# Patient Record
Sex: FEMALE | NOT HISPANIC OR LATINO | Employment: UNEMPLOYED | URBAN - METROPOLITAN AREA
[De-identification: names, ages, dates, MRNs, and addresses within clinical notes are randomized per-mention and may not be internally consistent; named-entity substitution may affect disease eponyms.]

---

## 2019-04-09 LAB
EXTERNAL ABO GROUPING: NORMAL
EXTERNAL ANTIBODY SCREEN: NEGATIVE
EXTERNAL HEPATITIS B SURFACE ANTIGEN: NEGATIVE
EXTERNAL HEPATITIS C AB: NEGATIVE
EXTERNAL RH FACTOR: POSITIVE
EXTERNAL RUBELLA QUALITATIVE: NORMAL
EXTERNAL SYPHILIS RPR SCREEN: NEGATIVE
HIV1 P24 AG SERPL QL IA: NEGATIVE

## 2019-08-16 ENCOUNTER — OFFICE VISIT (OUTPATIENT)
Dept: ENDOCRINOLOGY | Facility: CLINIC | Age: 24
End: 2019-08-16

## 2019-08-16 DIAGNOSIS — O24.410 DIET CONTROLLED GESTATIONAL DIABETES MELLITUS (GDM) IN THIRD TRIMESTER: ICD-10-CM

## 2019-08-16 PROCEDURE — G0108 DIAB MANAGE TRN  PER INDIV: HCPCS | Performed by: DIETITIAN, REGISTERED

## 2019-08-16 RX ORDER — BLOOD SUGAR DIAGNOSTIC
STRIP MISCELLANEOUS
Qty: 120 EACH | Refills: 3 | Status: SHIPPED | OUTPATIENT
Start: 2019-08-16 | End: 2019-10-12 | Stop reason: HOSPADM

## 2019-08-16 RX ORDER — BLOOD SUGAR DIAGNOSTIC
1 STRIP MISCELLANEOUS AS NEEDED
COMMUNITY
End: 2019-08-16 | Stop reason: SDUPTHER

## 2019-08-21 ENCOUNTER — LAB (OUTPATIENT)
Dept: LAB | Facility: HOSPITAL | Age: 24
End: 2019-08-21

## 2019-08-21 ENCOUNTER — OFFICE VISIT (OUTPATIENT)
Dept: ENDOCRINOLOGY | Facility: CLINIC | Age: 24
End: 2019-08-21

## 2019-08-21 VITALS
WEIGHT: 163 LBS | BODY MASS INDEX: 25.58 KG/M2 | DIASTOLIC BLOOD PRESSURE: 65 MMHG | HEIGHT: 67 IN | HEART RATE: 84 BPM | OXYGEN SATURATION: 99 % | SYSTOLIC BLOOD PRESSURE: 110 MMHG

## 2019-08-21 DIAGNOSIS — O24.419 GESTATIONAL DIABETES MELLITUS (GDM) IN THIRD TRIMESTER, GESTATIONAL DIABETES METHOD OF CONTROL UNSPECIFIED: ICD-10-CM

## 2019-08-21 DIAGNOSIS — O24.419 GESTATIONAL DIABETES MELLITUS (GDM) IN THIRD TRIMESTER, GESTATIONAL DIABETES METHOD OF CONTROL UNSPECIFIED: Primary | ICD-10-CM

## 2019-08-21 LAB — GLUCOSE BLDC GLUCOMTR-MCNC: 79 MG/DL (ref 70–130)

## 2019-08-21 PROCEDURE — 99202 OFFICE O/P NEW SF 15 MIN: CPT | Performed by: INTERNAL MEDICINE

## 2019-08-21 PROCEDURE — 36415 COLL VENOUS BLD VENIPUNCTURE: CPT

## 2019-08-21 PROCEDURE — 83036 HEMOGLOBIN GLYCOSYLATED A1C: CPT | Performed by: INTERNAL MEDICINE

## 2019-08-21 PROCEDURE — 82962 GLUCOSE BLOOD TEST: CPT | Performed by: INTERNAL MEDICINE

## 2019-08-21 RX ORDER — CHLORPHENIR/PHENYLEPH/ASPIRIN 2-7.8-325
TABLET, EFFERVESCENT ORAL
Refills: 3 | COMMUNITY
Start: 2019-08-16 | End: 2019-10-12 | Stop reason: HOSPADM

## 2019-08-21 NOTE — PROGRESS NOTES
Subjective   Jaylyn Crockett is a 24 y.o. female.     History of Present Illness     This is 24- year old female who is referred for Gestational Diabetes . She is G2 ( 31 week Pregnant ) . She failed 3 - Hr OGTT . She already had Diabetes education / Nutrition class .   She is checking BG 5 x daily - Fasting BS < 90 , and 1 hr glucose is < 120 mg /dl.     The following portions of the patient's history were reviewed and updated as appropriate: allergies, current medications, past family history, past medical history, past social history, past surgical history and problem list.    Review of Systems   Constitutional: Negative.    Eyes: Negative.    Respiratory: Negative.    Cardiovascular: Negative.    Gastrointestinal: Negative.    Endocrine: Negative.    Genitourinary: Negative for dysuria, frequency and urgency.   Musculoskeletal: negative for joint swelling,  Negative for myalgias.   Neurological: Negative for dizziness, light-headedness and memory problem.   Psychiatric/Behavioral: Negative for behavioral problems, hallucinations and depressed mood.       Physical Exam   Constitutional:  oriented to person, place, and time. She appears well-developed and well-nourished.   HENT:   Head: Normocephalic and atraumatic.   Eyes: EOM are normal. Pupils are equal, round, and reactive to light.   Neck: Normal range of motion. Neck supple.   Cardiovascular: Normal rate, regular rhythm and normal heart sounds.   Pulmonary/Chest: Effort normal and breath sounds normal.   Abdominal: Soft. Bowel sounds are normal.  +Ditension ( Gravid )   Musculoskeletal: Normal range of motion.   Neurological: She is alert and oriented to person, place, and time.   Skin: Skin is warm.   Psychiatric: normal mood and affect.  behavior is normal. Judgment and thought content normal.     Assessment/Plan     Gestational diabetes mellitus  BS are at good . Check A1c . Advised to continue monitor BS - call office for if FBS > 90 ,and 2 hr glucose  >120 mg/dl.

## 2019-08-21 NOTE — ASSESSMENT & PLAN NOTE
BS are at good . Check A1c . Advised to continue monitor BS - call office for if FBS > 90 ,and 2 hr glucose >120 mg/dl.

## 2019-08-22 LAB — HBA1C MFR BLD: 4.7 % (ref 3.5–5.6)

## 2019-08-28 ENCOUNTER — TELEPHONE (OUTPATIENT)
Dept: ENDOCRINOLOGY | Facility: CLINIC | Age: 24
End: 2019-08-28

## 2019-08-28 NOTE — TELEPHONE ENCOUNTER
Patient not available. Sent email No changes. BG well controlled. To keep sending BG results  Scanned to media

## 2019-09-03 ENCOUNTER — TELEPHONE (OUTPATIENT)
Dept: ENDOCRINOLOGY | Facility: CLINIC | Age: 24
End: 2019-09-03

## 2019-09-04 ENCOUNTER — TELEPHONE (OUTPATIENT)
Dept: ENDOCRINOLOGY | Facility: CLINIC | Age: 24
End: 2019-09-04

## 2019-09-12 ENCOUNTER — TELEPHONE (OUTPATIENT)
Dept: ENDOCRINOLOGY | Facility: CLINIC | Age: 24
End: 2019-09-12

## 2019-09-18 ENCOUNTER — OFFICE VISIT (OUTPATIENT)
Dept: ENDOCRINOLOGY | Facility: CLINIC | Age: 24
End: 2019-09-18

## 2019-09-18 VITALS
OXYGEN SATURATION: 96 % | HEIGHT: 67 IN | DIASTOLIC BLOOD PRESSURE: 80 MMHG | SYSTOLIC BLOOD PRESSURE: 122 MMHG | HEART RATE: 99 BPM | WEIGHT: 174 LBS | BODY MASS INDEX: 27.31 KG/M2

## 2019-09-18 DIAGNOSIS — O24.419 GESTATIONAL DIABETES MELLITUS (GDM) IN THIRD TRIMESTER, GESTATIONAL DIABETES METHOD OF CONTROL UNSPECIFIED: Primary | ICD-10-CM

## 2019-09-18 LAB — GLUCOSE BLDC GLUCOMTR-MCNC: 88 MG/DL (ref 70–130)

## 2019-09-18 PROCEDURE — 99212 OFFICE O/P EST SF 10 MIN: CPT | Performed by: INTERNAL MEDICINE

## 2019-09-18 PROCEDURE — 82962 GLUCOSE BLOOD TEST: CPT | Performed by: INTERNAL MEDICINE

## 2019-09-18 NOTE — PROGRESS NOTES
Subjective   Jaylyn Crockett is a 24 y.o. female.     History of Present Illness     This is 24- year old female who is referred for Gestational Diabetes . She is G2 ( 35 week Pregnant ) . She failed 3 - Hr OGTT . She  had Diabetes education / Nutrition class .   She is checking BG 5 x daily - Forgot to bring her log book . Reports most of her BS are at goal  mg /dl.     The following portions of the patient's history were reviewed and updated as appropriate: allergies, current medications, past family history, past medical history, past surgical history and problem list.    Review of Systems - Negative .     Objective   Physical Exam   Constitutional: She appears well-developed and well-nourished.   Eyes: Pupils are equal, round, and reactive to light.   Neck: Neck supple.   Cardiovascular: Normal rate.   Abdominal: Soft. She exhibits distension.   Skin: Skin is warm.           Assessment/Plan     Gestational diabetes mellitus  Diabetes is well controlled . A1c is 4.7 % - Continue to watch diet .

## 2019-09-20 LAB — EXTERNAL GROUP B STREP ANTIGEN: NEGATIVE

## 2019-09-26 ENCOUNTER — TELEPHONE (OUTPATIENT)
Dept: ENDOCRINOLOGY | Facility: CLINIC | Age: 24
End: 2019-09-26

## 2019-10-10 ENCOUNTER — ANESTHESIA (OUTPATIENT)
Dept: LABOR AND DELIVERY | Facility: HOSPITAL | Age: 24
End: 2019-10-10

## 2019-10-10 ENCOUNTER — ANESTHESIA EVENT (OUTPATIENT)
Dept: LABOR AND DELIVERY | Facility: HOSPITAL | Age: 24
End: 2019-10-10

## 2019-10-10 ENCOUNTER — HOSPITAL ENCOUNTER (OUTPATIENT)
Dept: LABOR AND DELIVERY | Facility: HOSPITAL | Age: 24
Discharge: HOME | End: 2019-10-10

## 2019-10-10 ENCOUNTER — HOSPITAL ENCOUNTER (INPATIENT)
Facility: HOSPITAL | Age: 24
LOS: 2 days | Discharge: HOME | End: 2019-10-12
Attending: OBSTETRICS & GYNECOLOGY | Admitting: OBSTETRICS & GYNECOLOGY

## 2019-10-10 PROBLEM — Z34.90 PREGNANCY: Status: RESOLVED | Noted: 2019-10-10 | Resolved: 2019-10-10

## 2019-10-10 PROBLEM — O24.419 GESTATIONAL DIABETES MELLITUS: Status: RESOLVED | Noted: 2019-08-21 | Resolved: 2019-10-10

## 2019-10-10 PROBLEM — Z34.90 PREGNANCY: Status: ACTIVE | Noted: 2019-10-10

## 2019-10-10 LAB
ABO GROUP BLD: NORMAL
BASOPHILS # BLD AUTO: 0.1 10*3/MM3 (ref 0–0.2)
BASOPHILS NFR BLD AUTO: 0.7 % (ref 0–1.5)
BB HOLD TUBE: NORMAL
BLD GP AB SCN SERPL QL: NEGATIVE
DEPRECATED RDW RBC AUTO: 48.6 FL (ref 37–54)
EOSINOPHIL # BLD AUTO: 0.1 10*3/MM3 (ref 0–0.4)
EOSINOPHIL NFR BLD AUTO: 0.7 % (ref 0.3–6.2)
ERYTHROCYTE [DISTWIDTH] IN BLOOD BY AUTOMATED COUNT: 15.3 % (ref 12.3–15.4)
GLUCOSE BLDC GLUCOMTR-MCNC: 76 MG/DL (ref 70–105)
GLUCOSE BLDC GLUCOMTR-MCNC: 85 MG/DL (ref 70–105)
GLUCOSE BLDC GLUCOMTR-MCNC: 95 MG/DL (ref 70–105)
HCT VFR BLD AUTO: 40.4 % (ref 34–46.6)
HGB BLD-MCNC: 13.6 G/DL (ref 12–15.9)
HOLD SPECIMEN: NORMAL
LYMPHOCYTES # BLD AUTO: 1.7 10*3/MM3 (ref 0.7–3.1)
LYMPHOCYTES NFR BLD AUTO: 16 % (ref 19.6–45.3)
MCH RBC QN AUTO: 30.6 PG (ref 26.6–33)
MCHC RBC AUTO-ENTMCNC: 33.7 G/DL (ref 31.5–35.7)
MCV RBC AUTO: 90.7 FL (ref 79–97)
MONOCYTES # BLD AUTO: 0.7 10*3/MM3 (ref 0.1–0.9)
MONOCYTES NFR BLD AUTO: 6.9 % (ref 5–12)
NEUTROPHILS # BLD AUTO: 8 10*3/MM3 (ref 1.7–7)
NEUTROPHILS NFR BLD AUTO: 75.7 % (ref 42.7–76)
NRBC BLD AUTO-RTO: 0.1 /100 WBC (ref 0–0.2)
PLATELET # BLD AUTO: 182 10*3/MM3 (ref 140–450)
PMV BLD AUTO: 9.3 FL (ref 6–12)
RBC # BLD AUTO: 4.45 10*6/MM3 (ref 3.77–5.28)
RH BLD: POSITIVE
T&S EXPIRATION DATE: NORMAL
WBC NRBC COR # BLD: 10.6 10*3/MM3 (ref 3.4–10.8)
WHOLE BLOOD HOLD SPECIMEN: NORMAL

## 2019-10-10 PROCEDURE — 86901 BLOOD TYPING SEROLOGIC RH(D): CPT

## 2019-10-10 PROCEDURE — 86850 RBC ANTIBODY SCREEN: CPT | Performed by: OBSTETRICS & GYNECOLOGY

## 2019-10-10 PROCEDURE — 86901 BLOOD TYPING SEROLOGIC RH(D): CPT | Performed by: OBSTETRICS & GYNECOLOGY

## 2019-10-10 PROCEDURE — 86780 TREPONEMA PALLIDUM: CPT | Performed by: OBSTETRICS & GYNECOLOGY

## 2019-10-10 PROCEDURE — 25010000002 FENTANYL CITRATE (PF) 100 MCG/2ML SOLUTION 2 ML VIAL: Performed by: ANESTHESIOLOGY

## 2019-10-10 PROCEDURE — 25010000002 ROPIVACAINE PER 1 MG: Performed by: ANESTHESIOLOGY

## 2019-10-10 PROCEDURE — 86900 BLOOD TYPING SEROLOGIC ABO: CPT

## 2019-10-10 PROCEDURE — 82962 GLUCOSE BLOOD TEST: CPT

## 2019-10-10 PROCEDURE — 86900 BLOOD TYPING SEROLOGIC ABO: CPT | Performed by: OBSTETRICS & GYNECOLOGY

## 2019-10-10 PROCEDURE — 0UQKXZZ REPAIR HYMEN, EXTERNAL APPROACH: ICD-10-PCS | Performed by: OBSTETRICS & GYNECOLOGY

## 2019-10-10 PROCEDURE — 85025 COMPLETE CBC W/AUTO DIFF WBC: CPT | Performed by: OBSTETRICS & GYNECOLOGY

## 2019-10-10 PROCEDURE — C1755 CATHETER, INTRASPINAL: HCPCS | Performed by: ANESTHESIOLOGY

## 2019-10-10 RX ORDER — ONDANSETRON 2 MG/ML
4 INJECTION INTRAMUSCULAR; INTRAVENOUS EVERY 6 HOURS PRN
Status: DISCONTINUED | OUTPATIENT
Start: 2019-10-10 | End: 2019-10-10 | Stop reason: HOSPADM

## 2019-10-10 RX ORDER — DIPHENHYDRAMINE HYDROCHLORIDE 50 MG/ML
12.5 INJECTION INTRAMUSCULAR; INTRAVENOUS EVERY 8 HOURS PRN
Status: DISCONTINUED | OUTPATIENT
Start: 2019-10-10 | End: 2019-10-10 | Stop reason: HOSPADM

## 2019-10-10 RX ORDER — SODIUM CHLORIDE, SODIUM LACTATE, POTASSIUM CHLORIDE, CALCIUM CHLORIDE 600; 310; 30; 20 MG/100ML; MG/100ML; MG/100ML; MG/100ML
125 INJECTION, SOLUTION INTRAVENOUS CONTINUOUS
Status: DISCONTINUED | OUTPATIENT
Start: 2019-10-10 | End: 2019-10-10

## 2019-10-10 RX ORDER — ONDANSETRON 2 MG/ML
4 INJECTION INTRAMUSCULAR; INTRAVENOUS ONCE AS NEEDED
Status: DISCONTINUED | OUTPATIENT
Start: 2019-10-10 | End: 2019-10-10 | Stop reason: HOSPADM

## 2019-10-10 RX ORDER — LIDOCAINE HYDROCHLORIDE 10 MG/ML
INJECTION, SOLUTION EPIDURAL; INFILTRATION; INTRACAUDAL; PERINEURAL
Status: COMPLETED
Start: 2019-10-10 | End: 2019-10-10

## 2019-10-10 RX ORDER — CARBOPROST TROMETHAMINE 250 UG/ML
250 INJECTION, SOLUTION INTRAMUSCULAR AS NEEDED
Status: DISCONTINUED | OUTPATIENT
Start: 2019-10-10 | End: 2019-10-10 | Stop reason: HOSPADM

## 2019-10-10 RX ORDER — ACETAMINOPHEN 325 MG/1
650 TABLET ORAL EVERY 4 HOURS PRN
Status: DISCONTINUED | OUTPATIENT
Start: 2019-10-10 | End: 2019-10-10 | Stop reason: HOSPADM

## 2019-10-10 RX ORDER — ONDANSETRON 4 MG/1
4 TABLET, FILM COATED ORAL EVERY 6 HOURS PRN
Status: DISCONTINUED | OUTPATIENT
Start: 2019-10-10 | End: 2019-10-10 | Stop reason: HOSPADM

## 2019-10-10 RX ORDER — OXYTOCIN-SODIUM CHLORIDE 0.9% IV SOLN 30 UNIT/500ML 30-0.9/5 UT/ML-%
250 SOLUTION INTRAVENOUS CONTINUOUS
Status: ACTIVE | OUTPATIENT
Start: 2019-10-10 | End: 2019-10-10

## 2019-10-10 RX ORDER — EPHEDRINE SULFATE 50 MG/ML
10 INJECTION, SOLUTION INTRAVENOUS
Status: DISCONTINUED | OUTPATIENT
Start: 2019-10-10 | End: 2019-10-10 | Stop reason: HOSPADM

## 2019-10-10 RX ORDER — MISOPROSTOL 200 UG/1
800 TABLET ORAL AS NEEDED
Status: DISCONTINUED | OUTPATIENT
Start: 2019-10-10 | End: 2019-10-10 | Stop reason: HOSPADM

## 2019-10-10 RX ORDER — OXYTOCIN-SODIUM CHLORIDE 0.9% IV SOLN 30 UNIT/500ML 30-0.9/5 UT/ML-%
999 SOLUTION INTRAVENOUS ONCE
Status: DISCONTINUED | OUTPATIENT
Start: 2019-10-10 | End: 2019-10-12 | Stop reason: HOSPADM

## 2019-10-10 RX ORDER — FAMOTIDINE 20 MG/1
20 TABLET, FILM COATED ORAL
Status: DISCONTINUED | OUTPATIENT
Start: 2019-10-10 | End: 2019-10-10 | Stop reason: HOSPADM

## 2019-10-10 RX ORDER — LANOLIN 100 %
OINTMENT (GRAM) TOPICAL
Status: DISCONTINUED | OUTPATIENT
Start: 2019-10-10 | End: 2019-10-12 | Stop reason: HOSPADM

## 2019-10-10 RX ORDER — MAGNESIUM CARB/ALUMINUM HYDROX 105-160MG
30 TABLET,CHEWABLE ORAL ONCE AS NEEDED
Status: DISCONTINUED | OUTPATIENT
Start: 2019-10-10 | End: 2019-10-10 | Stop reason: HOSPADM

## 2019-10-10 RX ORDER — PRENATAL VIT/IRON FUM/FOLIC AC 27MG-0.8MG
1 TABLET ORAL DAILY
Status: DISCONTINUED | OUTPATIENT
Start: 2019-10-11 | End: 2019-10-12 | Stop reason: HOSPADM

## 2019-10-10 RX ORDER — LIDOCAINE HYDROCHLORIDE 10 MG/ML
INJECTION, SOLUTION EPIDURAL; INFILTRATION; INTRACAUDAL; PERINEURAL AS NEEDED
Status: DISCONTINUED | OUTPATIENT
Start: 2019-10-10 | End: 2019-10-10 | Stop reason: SURG

## 2019-10-10 RX ORDER — PRENATAL VIT NO.126/IRON/FOLIC 28MG-0.8MG
1 TABLET ORAL DAILY
COMMUNITY

## 2019-10-10 RX ORDER — SODIUM CHLORIDE 0.9 % (FLUSH) 0.9 %
1-10 SYRINGE (ML) INJECTION AS NEEDED
Status: DISCONTINUED | OUTPATIENT
Start: 2019-10-10 | End: 2019-10-12 | Stop reason: HOSPADM

## 2019-10-10 RX ORDER — IBUPROFEN 600 MG/1
600 TABLET ORAL EVERY 6 HOURS PRN
Status: DISCONTINUED | OUTPATIENT
Start: 2019-10-10 | End: 2019-10-12 | Stop reason: HOSPADM

## 2019-10-10 RX ORDER — SODIUM CHLORIDE 0.9 % (FLUSH) 0.9 %
3 SYRINGE (ML) INJECTION EVERY 12 HOURS SCHEDULED
Status: DISCONTINUED | OUTPATIENT
Start: 2019-10-10 | End: 2019-10-10 | Stop reason: HOSPADM

## 2019-10-10 RX ORDER — LIDOCAINE HYDROCHLORIDE 20 MG/ML
INJECTION, SOLUTION INTRAVENOUS
Status: DISPENSED
Start: 2019-10-10 | End: 2019-10-10

## 2019-10-10 RX ORDER — METHYLERGONOVINE MALEATE 0.2 MG/ML
200 INJECTION INTRAVENOUS ONCE AS NEEDED
Status: DISCONTINUED | OUTPATIENT
Start: 2019-10-10 | End: 2019-10-10 | Stop reason: HOSPADM

## 2019-10-10 RX ORDER — IBUPROFEN 600 MG/1
600 TABLET ORAL EVERY 6 HOURS PRN
Status: DISCONTINUED | OUTPATIENT
Start: 2019-10-10 | End: 2019-10-10 | Stop reason: HOSPADM

## 2019-10-10 RX ORDER — LIDOCAINE HYDROCHLORIDE 20 MG/ML
INJECTION, SOLUTION EPIDURAL; INFILTRATION; INTRACAUDAL; PERINEURAL AS NEEDED
Status: DISCONTINUED | OUTPATIENT
Start: 2019-10-10 | End: 2019-10-10 | Stop reason: SURG

## 2019-10-10 RX ORDER — OXYTOCIN-SODIUM CHLORIDE 0.9% IV SOLN 30 UNIT/500ML 30-0.9/5 UT/ML-%
2 SOLUTION INTRAVENOUS
Status: DISCONTINUED | OUTPATIENT
Start: 2019-10-10 | End: 2019-10-10 | Stop reason: HOSPADM

## 2019-10-10 RX ORDER — DOCUSATE SODIUM 100 MG/1
100 CAPSULE, LIQUID FILLED ORAL 2 TIMES DAILY
Status: DISCONTINUED | OUTPATIENT
Start: 2019-10-10 | End: 2019-10-12 | Stop reason: HOSPADM

## 2019-10-10 RX ORDER — OXYTOCIN-SODIUM CHLORIDE 0.9% IV SOLN 30 UNIT/500ML 30-0.9/5 UT/ML-%
125 SOLUTION INTRAVENOUS CONTINUOUS PRN
Status: DISCONTINUED | OUTPATIENT
Start: 2019-10-10 | End: 2019-10-12 | Stop reason: HOSPADM

## 2019-10-10 RX ORDER — ONDANSETRON 4 MG/1
4 TABLET, FILM COATED ORAL EVERY 8 HOURS PRN
Status: DISCONTINUED | OUTPATIENT
Start: 2019-10-10 | End: 2019-10-12 | Stop reason: HOSPADM

## 2019-10-10 RX ADMIN — IBUPROFEN 600 MG: 600 TABLET ORAL at 20:47

## 2019-10-10 RX ADMIN — SODIUM CHLORIDE, SODIUM LACTATE, POTASSIUM CHLORIDE, AND CALCIUM CHLORIDE 125 ML/HR: 600; 310; 30; 20 INJECTION, SOLUTION INTRAVENOUS at 10:52

## 2019-10-10 RX ADMIN — WITCH HAZEL 1 PAD: 500 SOLUTION RECTAL; TOPICAL at 20:42

## 2019-10-10 RX ADMIN — LIDOCAINE HYDROCHLORIDE 4 MG: 20 INJECTION, SOLUTION EPIDURAL; INFILTRATION; INTRACAUDAL; PERINEURAL at 10:52

## 2019-10-10 RX ADMIN — SODIUM CHLORIDE, SODIUM LACTATE, POTASSIUM CHLORIDE, AND CALCIUM CHLORIDE 125 ML/HR: 600; 310; 30; 20 INJECTION, SOLUTION INTRAVENOUS at 07:17

## 2019-10-10 RX ADMIN — BENZOCAINE 1 SPRAY: 11.4 AEROSOL, SPRAY TOPICAL at 20:42

## 2019-10-10 RX ADMIN — ROPIVACAINE HYDROCHLORIDE 10 ML/HR: 2 INJECTION, SOLUTION EPIDURAL; INFILTRATION; PERINEURAL at 10:33

## 2019-10-10 RX ADMIN — OXYTOCIN 2 MILLI-UNITS/MIN: 10 INJECTION INTRAVENOUS at 07:17

## 2019-10-10 RX ADMIN — LIDOCAINE HYDROCHLORIDE 4 ML: 10 INJECTION, SOLUTION EPIDURAL; INFILTRATION; INTRACAUDAL; PERINEURAL at 10:52

## 2019-10-10 RX ADMIN — DOCUSATE SODIUM 100 MG: 100 CAPSULE, LIQUID FILLED ORAL at 20:43

## 2019-10-10 NOTE — PLAN OF CARE
Problem: Patient Care Overview  Goal: Plan of Care Review  Outcome: Ongoing (interventions implemented as appropriate)   10/10/19 4143   Coping/Psychosocial   Plan of Care Reviewed With patient   Plan of Care Review   Progress improving   OTHER   Outcome Summary patient voiding appropriately. patient has ambulated to the bathroom with assistance, performed melanie care, and complains of no pain at this time       Problem: Postpartum (Vaginal Delivery) (Adult,Obstetrics,Pediatric)  Goal: Signs and Symptoms of Listed Potential Problems Will be Absent, Minimized or Managed (Postpartum)  Outcome: Ongoing (interventions implemented as appropriate)      Problem: Breastfeeding (Adult,Obstetrics,Pediatric)  Goal: Signs and Symptoms of Listed Potential Problems Will be Absent, Minimized or Managed (Breastfeeding)  Outcome: Ongoing (interventions implemented as appropriate)

## 2019-10-10 NOTE — H&P
[facility]  Obstetric History and Physical     Chief Complaint: Induction of labor    Subjective     Patient is a 24 y.o. female  currently at 39w0d , who presents with induction of labor.    Her prenatal care is complicated by  diabetes  GDM A1 and Diabetes has been  well controlled..  Her previous obstetric/gynecological history is noted for is non-contributory.      Prenatal Information:  Prenatal Results     Initial Prenatal Labs     Test Value Reference Range Date Time    Hemoglobin        Hematocrit        Platelets 182 10*3/mm3 140 - 450 10*3/mm3 10/10/19 0651    Rubella IgG Immune   19     Hepatitis B SAg Negative   19     Hepatitis C Ab Negative   19     RPR Negative   19     ABO B   10/10/19 0651    Rh Positive   10/10/19 0651    Antibody Screen Negative   19     HIV Negative   19     Urine Culture        Gonorrhea        Chlamydia        TSH              2nd and 3rd Trimester     Test Value Reference Range Date Time    Hemoglobin (repeated) 13.6 g/dL 12.0 - 15.9 g/dL 10/10/19 0651    Hematocrit (repeated) 40.4 % 34.0 - 46.6 % 10/10/19 0651    GCT        Antibody Screen (repeated) Negative   10/10/19 0651    GTT Fasting        GTT 1 Hr        GTT 2 Hr        GTT 3 Hr        Group B Strep Negative   19           Drug Screening     Test Value Reference Range Date Time    Amphetamine Screen        Barbiturate Screen        Benzodiazepine Screen        Methadone Screen        Phencyclidine Screen        Opiates Screen        THC Screen        Cocaine Screen        Propoxyphene Screen        Buprenorphine Screen        Methamphetamine Screen        Oxycodone Screen        Tricyclic Antidepressants Screen              Other (Risk screening)     Test Value Reference Range Date Time    Varicella IgG        Parvovirus IgG        CMV IgG        Cystic Fibrosis        Hemoglobin electrophoresis        NIPT        MSAFP-4        AFP (for NTD only)                   External Prenatal Results     Pregnancy Outside Results - Transcribed From Office Records - See Scanned Records For Details     Test Value Date Time    Hgb 13.6 g/dL 10/10/19 0651    Hct 40.4 % 10/10/19 0651    ABO B  10/10/19 0651    Rh Positive  10/10/19 0651    Antibody Screen Negative  10/10/19 06    Glucose Fasting GTT       Glucose Tolerance Test 1 hour       Glucose Tolerance Test 3 hour       Gonorrhea (discrete)       Chlamydia (discrete)       RPR Negative  19     VDRL       Syphilis Antibody       Rubella Immune  19     HBsAg Negative  19     Herpes Simplex Virus PCR       Herpes Simplex VIrus Culture       HIV Negative  19     Hep C RNA Quant PCR       Hep C Antibody Negative  19     AFP       Group B Strep Negative  19     GBS Susceptibility to Clindamycin       GBS Susceptibility to Erythromycin       Fetal Fibronectin       Genetic Testing, Maternal Blood             Drug Screening     Test Value Date Time    Urine Drug Screen       Amphetamine Screen       Barbiturate Screen       Benzodiazepine Screen       Methadone Screen       Phencyclidine Screen       Opiates Screen       THC Screen       Cocaine Screen       Propoxyphene Screen       Buprenorphine Screen       Methamphetamine Screen       Oxycodone Screen       Tricyclic Antidepressants Screen                    Past OB History:       Obstetric History       T0      L0     SAB1   TAB0   Ectopic0   Molar0   Multiple0   Live Births0                Past Medical History: Past Medical History:   Diagnosis Date   • Gestational diabetes         Past Surgical History History reviewed. No pertinent surgical history.     Family History: Family History   Family history unknown: Yes      Social History:  reports that she has never smoked. She has never used smokeless tobacco.   reports that she does not drink alcohol.   reports that she does not use drugs.        General ROS: Pertinent items are  noted in HPI    Objective      Vitals:     Vitals:    10/10/19 0623 10/10/19 0630 10/10/19 0800 10/10/19 0900   BP: 134/75 129/76 129/85 112/69   Pulse: 76 85 83 76   Resp: 18      Temp: 98.6 °F (37 °C)      TempSrc: Oral      SpO2:  99%     Weight:       Height:           Fetal Heart Rate Assessment:   Category 1    [place]:   q 3     Physical Exam:     General Appearance:    Alert, cooperative, in no acute distress   Abdomen:     Soft, non-tender, no guarding, no rebound tenderness,       EFW 7 1/2 #.   Pelvic Exam:    Pelvis adequate, however, arch narrow in the AP diameter, non gynecoid pelvic type    Presentation: vertex    Cervix: 3/75/-1 AROM, IUPC placed without difficulty   Extremities:   Moves all extremities well, no edema, no cyanosis, no              redness   Skin:   No bleeding, bruising or rash   Neurologic:   No focal neurologic defect          Laboratory Results:   Lab Results (last 48 hours)     Procedure Component Value Units Date/Time    POC Glucose Once [[id number]]  (Normal) Collected:  10/10/19 0839    Specimen:  Blood Updated:  10/10/19 0841     Glucose 76 mg/dL      Comment: Serial Number: [id number]:  [id number]       CBC & Differential [[id number]] Collected:  10/10/19 0651    Specimen:  Blood Updated:  10/10/19 0804    Narrative:       The following orders were created for panel order CBC & Differential.  Procedure                               Abnormality         Status                     ---------                               -----------         ------                     CBC Auto Differential[[id number]]        Abnormal            Final result                 Please view results for these tests on the individual orders.    CBC Auto Differential [[id number]]  (Abnormal) Collected:  10/10/19 0651    Specimen:  Blood Updated:  10/10/19 0804     WBC 10.60 10*3/mm3      RBC 4.45 10*6/mm3      Hemoglobin 13.6 g/dL      Hematocrit 40.4 %      MCV 90.7 fL      MCH 30.6 pg      MCHC 33.7 g/dL       RDW 15.3 %      RDW-SD 48.6 fl      MPV 9.3 fL      Platelets 182 10*3/mm3      Neutrophil % 75.7 %      Lymphocyte % 16.0 %      Monocyte % 6.9 %      Eosinophil % 0.7 %      Basophil % 0.7 %      Neutrophils, Absolute 8.00 10*3/mm3      Lymphocytes, Absolute 1.70 10*3/mm3      Monocytes, Absolute 0.70 10*3/mm3      Eosinophils, Absolute 0.10 10*3/mm3      Basophils, Absolute 0.10 10*3/mm3      nRBC 0.1 /100 WBC     [place] Draw [[id number]] Collected:  10/10/19 0651    Specimen:  Blood Updated:  10/10/19 0800    Narrative:       The following orders were created for panel order [place] Draw.  Procedure                               Abnormality         Status                     ---------                               -----------         ------                     Lavender Top[[id number]]                                     Final result               Gold Top - SST[[id number]]                                   Final result                 Please view results for these tests on the individual orders.    Lavender Top [[id number]] Collected:  10/10/19 0651    Specimen:  Blood Updated:  10/10/19 0800     Extra Tube hold for add-on     Comment: Auto resulted       Gold Top - SST [[id number]] Collected:  10/10/19 0651    Specimen:  Blood Updated:  10/10/19 0800     Extra Tube Hold for add-ons.     Comment: Auto resulted.       POC Glucose Once [[id number]]  (Normal) Collected:  10/10/19 0628    Specimen:  Blood Updated:  10/10/19 0630     Glucose 95 mg/dL      Comment: Serial Number: [id number]:  [id number]       Rubella Antibody, IgG [[id number]] Resulted:  04/09/19     Specimen:  Blood Updated:  10/10/19 0623     External Rubella Qual Immune    HIV-1 Antibody, EIA [[id number]] Resulted:  04/09/19     Specimen:  Blood Updated:  10/10/19 0623     External HIV Antibody Negative    Group B Streptococcus Culture - Swab, Vaginal/Rectum [[id number]] Resulted:  09/20/19     Specimen:  Swab from Vaginal/Rectum Updated:   10/10/19 0623     External Strep Group B Ag Negative    Hepatitis C Antibody [[id number]] Resulted:  04/09/19     Specimen:  Blood Updated:  10/10/19 0623     External Hepatitis C Ab Negative    Hepatitis B Surface Antigen [[id number]] Resulted:  04/09/19     Specimen:  Blood Updated:  10/10/19 0623     External Hepatitis B Surface Ag Negative    RPR [[id number]] Resulted:  04/09/19     Specimen:  Blood Updated:  10/10/19 0623     External RPR Negative             Assessment/Plan     Active Problems:    Pregnancy         Assessment:  1.  Intrauterine pregnancy at 39w0d gestation with reactive fetal status.    2.  induction of labor  for GDM diet controlled  with favorable cervix   3.  GBS status:   External Strep Group B Ag   Date Value Ref Range Status   09/20/2019 Negative  Final       Plan:  1. fetal and uterine monitoring  continuously and labor augmentation  Pitocin  2. Plan of care has been reviewed with patient.  3.  Risks, benefits of treatment plan have been discussed.  4.  All questions have been answered.         Sybil Bryant MD   10/10/2019   9:22 AM

## 2019-10-10 NOTE — ANESTHESIA PREPROCEDURE EVALUATION
Anesthesia Evaluation     Patient summary reviewed and Nursing notes reviewed   NPO Solid Status: N/A  NPO Liquid Status: N/A           Airway   Mallampati: I  TM distance: >3 FB  Neck ROM: full  No difficulty expected  Dental - normal exam     Pulmonary - negative pulmonary ROS   Cardiovascular - negative cardio ROS and normal exam        Neuro/Psych- negative ROS  GI/Hepatic/Renal/Endo    (+)   diabetes mellitus gestational,     Musculoskeletal (-) negative ROS    Abdominal    Substance History - negative use     OB/GYN    (+) Pregnant,         Other        ROS/Med Hx Other: IOL      Phys Exam Other: pregnant                Anesthesia Plan    ASA 3     epidural     Anesthetic plan, all risks, benefits, and alternatives have been provided, discussed and informed consent has been obtained with: patient.

## 2019-10-10 NOTE — ANESTHESIA PROCEDURE NOTES
Labor Epidural      Patient reassessed immediately prior to procedure    Patient location during procedure: OB  Start Time: 10/10/2019 10:19 AM  Stop Time: 10/10/2019 10:38 AM  Performed By  Anesthesiologist: Paulina Mallory MD  Preanesthetic Checklist  Completed: patient identified, site marked, surgical consent, pre-op evaluation, timeout performed, IV checked, risks and benefits discussed and monitors and equipment checked  Prep:  Sterile Tech:gloves, cap and sterile barrier  Prep:povidone-iodine 7.5% surgical scrub  Monitoring:continuous pulse oximetry, EKG and blood pressure monitoring  Epidural Block Procedure:  Approach:midline  Location:L5-S1  Needle Type:Tuohy  Needle Gauge:17 G  Loss of Resistance Medium: air  Cath Depth at skin:13 cm  Paresthesia: none  Aspiration:negative  Test Dose:negative  Number of Attempts: 1  Post Assessment:  Dressing:secured with tape and occlusive dressing applied  Pt Tolerance:patient tolerated the procedure well with no apparent complications  Complications:no

## 2019-10-10 NOTE — L&D DELIVERY NOTE
[facility]  Vaginal Delivery Note    Diagnosis     Patient is a 24 y.o. female  currently at 39w0d, who presents with induction of labor d/t GDM at term.      Delivery     Delivery:  Spontaneous Vaginal Delivery    Date of Delivery:  10/10/2019   Anesthesia:  Epidural   Delivering clinician: Sybil Bryant MD      Delivery narrative:  PT presented for IOL.  She was 3 cm dilated on arrival and underwent pitocin induction. Blood glucose levels were normal throughout labor.  She progressed on a normal labor curve with an overall reassuring fetal status throughout, she rec'd an aminoinfusion d/t persistent variables and good response with overall reassuring fetal status.  She was complete by about 12:30pm and pushed with excellent effort for about 30 minutes to deliver a vigorous infant without difficulty.      Infant    Findings: VMI     Apgars: 9&9 at 1 and 5 minutes.      Placenta, Cord, and Fluid    Placenta delivered  spontaneous  3VC          Lacerations       R sulcus laceration and separation of the introitus from the inner vagina at the hymeneal ring in a horizontal fashion which was repaired with 3.0 Vicryl rapide in the usual fashion. Tissue was friable and repair was difficult d/t friable tissue and the nature of the tear.      Estimated Blood Loss 300cc     Complications  none    Disposition  Mother to Mother Baby/Postpartum  in stable condition currently.  Baby to remains with mom  in stable condition currently.      Sybil Bryant MD  10/10/19  1:57 PM

## 2019-10-11 LAB
BASOPHILS # BLD AUTO: 0 10*3/MM3 (ref 0–0.2)
BASOPHILS NFR BLD AUTO: 0.3 % (ref 0–1.5)
DEPRECATED RDW RBC AUTO: 49.4 FL (ref 37–54)
EOSINOPHIL # BLD AUTO: 0 10*3/MM3 (ref 0–0.4)
EOSINOPHIL NFR BLD AUTO: 0.3 % (ref 0.3–6.2)
ERYTHROCYTE [DISTWIDTH] IN BLOOD BY AUTOMATED COUNT: 15.4 % (ref 12.3–15.4)
HCT VFR BLD AUTO: 34.7 % (ref 34–46.6)
HGB BLD-MCNC: 11.3 G/DL (ref 12–15.9)
LYMPHOCYTES # BLD AUTO: 1.5 10*3/MM3 (ref 0.7–3.1)
LYMPHOCYTES NFR BLD AUTO: 11.4 % (ref 19.6–45.3)
MCH RBC QN AUTO: 29.6 PG (ref 26.6–33)
MCHC RBC AUTO-ENTMCNC: 32.6 G/DL (ref 31.5–35.7)
MCV RBC AUTO: 90.6 FL (ref 79–97)
MONOCYTES # BLD AUTO: 0.8 10*3/MM3 (ref 0.1–0.9)
MONOCYTES NFR BLD AUTO: 5.9 % (ref 5–12)
NEUTROPHILS # BLD AUTO: 11.2 10*3/MM3 (ref 1.7–7)
NEUTROPHILS NFR BLD AUTO: 82.1 % (ref 42.7–76)
NRBC BLD AUTO-RTO: 0.1 /100 WBC (ref 0–0.2)
PLATELET # BLD AUTO: 175 10*3/MM3 (ref 140–450)
PMV BLD AUTO: 9.2 FL (ref 6–12)
RBC # BLD AUTO: 3.82 10*6/MM3 (ref 3.77–5.28)
T PALLIDUM IGG SER QL: NORMAL
WBC NRBC COR # BLD: 13.6 10*3/MM3 (ref 3.4–10.8)

## 2019-10-11 PROCEDURE — 85025 COMPLETE CBC W/AUTO DIFF WBC: CPT | Performed by: OBSTETRICS & GYNECOLOGY

## 2019-10-11 RX ADMIN — DOCUSATE SODIUM 100 MG: 100 CAPSULE, LIQUID FILLED ORAL at 08:46

## 2019-10-11 RX ADMIN — PRENATAL VIT W/ FE FUMARATE-FA TAB 27-0.8 MG 1 TABLET: 27-0.8 TAB at 08:46

## 2019-10-11 RX ADMIN — DOCUSATE SODIUM 100 MG: 100 CAPSULE, LIQUID FILLED ORAL at 19:59

## 2019-10-11 RX ADMIN — IBUPROFEN 600 MG: 600 TABLET ORAL at 19:59

## 2019-10-11 RX ADMIN — IBUPROFEN 600 MG: 600 TABLET ORAL at 08:46

## 2019-10-11 NOTE — PLAN OF CARE
Problem: Patient Care Overview  Goal: Plan of Care Review  Outcome: Ongoing (interventions implemented as appropriate)   10/11/19 0458   Coping/Psychosocial   Plan of Care Reviewed With patient   Plan of Care Review   Progress no change   OTHER   Outcome Summary ambulating around the room without difficulty. voiding. pain controlled with prn motrin. baby breastfeeding fair, spitty       Problem: Postpartum (Vaginal Delivery) (Adult,Obstetrics,Pediatric)  Goal: Signs and Symptoms of Listed Potential Problems Will be Absent, Minimized or Managed (Postpartum)  Outcome: Ongoing (interventions implemented as appropriate)      Problem: Breastfeeding (Adult,Obstetrics,Pediatric)  Goal: Signs and Symptoms of Listed Potential Problems Will be Absent, Minimized or Managed (Breastfeeding)  Outcome: Ongoing (interventions implemented as appropriate)

## 2019-10-12 VITALS
TEMPERATURE: 98.6 F | DIASTOLIC BLOOD PRESSURE: 65 MMHG | SYSTOLIC BLOOD PRESSURE: 104 MMHG | HEIGHT: 67 IN | OXYGEN SATURATION: 98 % | RESPIRATION RATE: 16 BRPM | BODY MASS INDEX: 28.37 KG/M2 | HEART RATE: 92 BPM | WEIGHT: 180.78 LBS

## 2019-10-12 RX ORDER — IBUPROFEN 600 MG/1
600 TABLET ORAL EVERY 6 HOURS PRN
Qty: 20 TABLET | Refills: 0 | Status: SHIPPED | OUTPATIENT
Start: 2019-10-12

## 2019-10-12 RX ADMIN — IBUPROFEN 600 MG: 600 TABLET ORAL at 09:14

## 2019-10-12 RX ADMIN — PRENATAL VIT W/ FE FUMARATE-FA TAB 27-0.8 MG 1 TABLET: 27-0.8 TAB at 09:13

## 2019-10-12 RX ADMIN — DOCUSATE SODIUM 100 MG: 100 CAPSULE, LIQUID FILLED ORAL at 09:14

## 2019-10-12 NOTE — PLAN OF CARE
Problem: Patient Care Overview  Goal: Discharge Needs Assessment  Outcome: Ongoing (interventions implemented as appropriate)      Problem: Postpartum (Vaginal Delivery) (Adult,Obstetrics,Pediatric)  Goal: Signs and Symptoms of Listed Potential Problems Will be Absent, Minimized or Managed (Postpartum)  Outcome: Ongoing (interventions implemented as appropriate)      Problem: Breastfeeding (Adult,Obstetrics,Pediatric)  Goal: Signs and Symptoms of Listed Potential Problems Will be Absent, Minimized or Managed (Breastfeeding)  Outcome: Ongoing (interventions implemented as appropriate)

## 2019-10-12 NOTE — PLAN OF CARE
Problem: Patient Care Overview  Goal: Individualization and Mutuality  Outcome: Outcome(s) achieved Date Met: 10/12/19    Goal: Interprofessional Rounds/Family Conf  Outcome: Outcome(s) achieved Date Met: 10/12/19

## 2019-10-12 NOTE — DISCHARGE SUMMARY
[facility]  Delivery Discharge Summary    Primary OB Clinician: Sybil Bryant MD    Admission Diagnosis:  Principal Problem:     (normal spontaneous vaginal delivery)      Discharge Diagnosis:  same    Gestational Age: 39w0d    Date of Delivery: 10/10/2019     Delivered By:  Sybil Bryant     Delivery Type: Vaginal, Spontaneous      Tubal Ligation: n/a    Intrapartum Course: Uncomplicated delivery.     Postpartum Course:  Uncomplicated pp course.     Birth Control Method: Nexplanon    Physical Exam:    Vitals:   Vitals:    10/11/19 1230 10/11/19 1620 10/11/19 2214 10/12/19 0700   BP: 102/66 99/60 98/60 104/65   BP Location: Right arm  Right arm Right arm   Patient Position: Sitting  Lying Lying   Pulse: 79 81 97 92   Resp: 16 16 16 16   Temp: 97.8 °F (36.6 °C) 99.2 °F (37.3 °C) 98.5 °F (36.9 °C) 98.6 °F (37 °C)   TempSrc: Oral  Oral Oral   SpO2: 98% 99% 98%    Weight:       Height:         Temp (24hrs), Av.5 °F (36.9 °C), Min:97.8 °F (36.6 °C), Max:99.2 °F (37.3 °C)      General Appearance:    Alert, cooperative, in no acute distress   Abdomen:     Soft non-tender, non-distended, no guarding, no rebound         tenderness.   Extremities:   Moves all extremities well, no edema, no cyanosis, no              Redness.   Incision:  None   Fundus:   Firm, below umbilicus     Feeding method: Breastfeeding Status: Yes    Blood Type:    Results from last 7 days   Lab Units 10/10/19  0651   ABO TYPING  B   RH TYPING  Positive       Labs:   Results from last 7 days   Lab Units 10/11/19  1248 10/10/19  0651   WBC 10*3/mm3 13.60* 10.60   HEMOGLOBIN g/dL 11.3* 13.6   HEMATOCRIT % 34.7 40.4   PLATELETS 10*3/mm3 175 182               Plan:  Discharge to home.    Follow-up appointment with Sybil Bryant MD in 6 weeks.

## 2019-10-12 NOTE — PLAN OF CARE
Problem: Patient Care Overview  Goal: Plan of Care Review  Outcome: Outcome(s) achieved Date Met: 10/12/19

## 2019-10-12 NOTE — LACTATION NOTE
This note was copied from a baby's chart.  Observed feeding in cradle hold. Baby latched wide, frequent audible swallowing noted. Breasts softened as feeding progressed. Instructed on gentle stimulation to keep baby feeding. Encouraged significant other to assist mother with diaper changes and feedings to provided a better breastfeeding experience. Provided with  discharge weight ticket and lactation contact card. Encouraged to call as needed.

## 2019-10-12 NOTE — LACTATION NOTE
This note was copied from a baby's chart.  Provided mother with handouts and gelpads. Basic teaching done. Denies history of breast surgery. Denies use of routine medications. Denies wool allergy. Has a new double electric pump at home, does not know the brand name. Will read thru breastfeeding info packet. Reports starting to feel some breast changes . Nipples tender with initial latch only. Mother eating breakfast now while watching breastfeeding DVD. To call for feeding observation.

## 2019-10-14 NOTE — PAYOR COMM NOTE
"This is discharge notice for Jaylyn Aiken, auth# [id number]    Pt discharged home routine on 10/12/19.    MEGAN CLARKE RN  UTILIZATION REVIEW  [facility]  PH: [phone]  FX: [phone]      Jaylyn Aiken (24 y.o. Female)     Date of Birth Social Security Number Address Home Phone MRN    1995  [id number]  [place] [address] [phone] [id number]    Taoism Marital Status          None        Admission Date Admission Type Admitting Provider Attending Provider Department, Room/Bed    10/10/19 Elective Sybil Bryant MD  [facility] MOTHER BABY, M400/1    Discharge Date Discharge Disposition Discharge Destination        10/12/2019 Home or Self Care              Attending Provider:  (none)   Allergies:  No Known Allergies    Isolation:  None   Infection:  None   Code Status:  Prior    Ht:  170.2 cm (67\")   Wt:  82 kg (180 lb 12.4 oz)    Admission Cmt:  None   Principal Problem:   (normal spontaneous vaginal delivery) [O80]                 Active Insurance as of 10/10/2019     Primary Coverage     Payor Plan Insurance Group Employer/Plan Group    [employer] PPO [id number]     Payor Plan Address Payor Plan Phone Number Payor Plan Fax Number Effective Dates    [phone]  2019 - None Entered    [place] [address]       Subscriber Name Subscriber Birth Date Member ID       JAYLYN AIKEN 1995 [id number]                 Emergency Contacts      (Rel.) Home Phone Work Phone Mobile Phone    YOMI AIKEN (Spouse) -- -- [phone]               Discharge Summary      Poornima Eid MD at 10/12/19 1126          [facility]  Delivery Discharge Summary    Primary OB Clinician: Sybil Bryant MD    Admission Diagnosis:  Principal Problem:     (normal spontaneous vaginal delivery)      Discharge Diagnosis:  same    Gestational Age: 39w0d    Date of Delivery: 10/10/2019 "     Delivered By:  Sybil Bryant     Delivery Type: Vaginal, Spontaneous      Tubal Ligation: n/a    Intrapartum Course: Uncomplicated delivery.     Postpartum Course:  Uncomplicated pp course.     Birth Control Method: Nexplanon    Physical Exam:    Vitals:   Vitals:    10/11/19 1230 10/11/19 1620 10/11/19 2214 10/12/19 0700   BP: 102/66 99/60 98/60 104/65   BP Location: Right arm  Right arm Right arm   Patient Position: Sitting  Lying Lying   Pulse: 79 81 97 92   Resp: 16 16 16 16   Temp: 97.8 °F (36.6 °C) 99.2 °F (37.3 °C) 98.5 °F (36.9 °C) 98.6 °F (37 °C)   TempSrc: Oral  Oral Oral   SpO2: 98% 99% 98%    Weight:       Height:         Temp (24hrs), Av.5 °F (36.9 °C), Min:97.8 °F (36.6 °C), Max:99.2 °F (37.3 °C)      General Appearance:    Alert, cooperative, in no acute distress   Abdomen:     Soft non-tender, non-distended, no guarding, no rebound         tenderness.   Extremities:   Moves all extremities well, no edema, no cyanosis, no              Redness.   Incision:  None   Fundus:   Firm, below umbilicus     Feeding method: Breastfeeding Status: Yes    Blood Type:    Results from last 7 days   Lab Units 10/10/19  0651   ABO TYPING  B   RH TYPING  Positive       Labs:   Results from last 7 days   Lab Units 10/11/19  1248 10/10/19  0651   WBC 10*3/mm3 13.60* 10.60   HEMOGLOBIN g/dL 11.3* 13.6   HEMATOCRIT % 34.7 40.4   PLATELETS 10*3/mm3 175 182               Plan:  Discharge to home.    Follow-up appointment with Sybil Bryant MD in 6 weeks.        Electronically signed by Poornima Eid MD at 10/12/19 6742

## 2019-12-10 ENCOUNTER — TELEPHONE (OUTPATIENT)
Dept: ENDOCRINOLOGY | Facility: CLINIC | Age: 24
End: 2019-12-10